# Patient Record
Sex: FEMALE | Race: WHITE | NOT HISPANIC OR LATINO
[De-identification: names, ages, dates, MRNs, and addresses within clinical notes are randomized per-mention and may not be internally consistent; named-entity substitution may affect disease eponyms.]

---

## 2019-03-11 ENCOUNTER — TRANSCRIPTION ENCOUNTER (OUTPATIENT)
Age: 49
End: 2019-03-11

## 2023-12-13 ENCOUNTER — TELEMEDICINE (OUTPATIENT)
Dept: OBGYN CLINIC | Facility: CLINIC | Age: 53
End: 2023-12-13
Payer: OTHER GOVERNMENT

## 2023-12-13 DIAGNOSIS — N64.4 MASTODYNIA: ICD-10-CM

## 2023-12-13 DIAGNOSIS — N95.1 MENOPAUSAL SYMPTOMS: Primary | ICD-10-CM

## 2023-12-13 PROBLEM — E66.811 OBESITY, CLASS I, BMI 30.0-34.9 (SEE ACTUAL BMI): Status: ACTIVE | Noted: 2023-03-22

## 2023-12-13 PROBLEM — R00.2 PALPITATIONS: Status: ACTIVE | Noted: 2023-03-22

## 2023-12-13 PROBLEM — N39.3 SUI (STRESS URINARY INCONTINENCE, FEMALE): Status: ACTIVE | Noted: 2023-06-15

## 2023-12-13 PROBLEM — R53.82 CHRONIC FATIGUE: Status: ACTIVE | Noted: 2023-03-22

## 2023-12-13 PROBLEM — G43.109 OCULAR MIGRAINE: Status: ACTIVE | Noted: 2023-03-22

## 2023-12-13 PROBLEM — G93.49 WHITE MATTER OF BRAIN SYNDROME: Status: ACTIVE | Noted: 2023-03-22

## 2023-12-13 PROBLEM — E66.9 OBESITY, CLASS I, BMI 30.0-34.9 (SEE ACTUAL BMI): Status: ACTIVE | Noted: 2023-03-22

## 2023-12-13 PROCEDURE — 99204 OFFICE O/P NEW MOD 45 MIN: CPT | Performed by: OBSTETRICS & GYNECOLOGY

## 2023-12-13 RX ORDER — ESTRADIOL 0.05 MG/D
PATCH, EXTENDED RELEASE TRANSDERMAL
COMMUNITY
Start: 2023-10-02

## 2023-12-13 RX ORDER — OLOPATADINE HYDROCHLORIDE 1 MG/ML
1 SOLUTION/ DROPS OPHTHALMIC
COMMUNITY

## 2023-12-13 RX ORDER — PROGESTERONE 200 MG/1
1 CAPSULE ORAL DAILY
COMMUNITY
Start: 2023-09-19

## 2023-12-14 NOTE — PROGRESS NOTES
Virtual Regular Visit    Verification of patient location:    Patient is located at Home in the following state in which I hold an active license [place]      Assessment/Plan:    Problem List Items Addressed This Visit    None  Visit Diagnoses       Menopausal symptoms    -  Primary    Relevant Orders    Follicle stimulating hormone    Estradiol    Progesterone    Mastodynia        Relevant Orders    Follicle stimulating hormone    Estradiol    Progesterone          1) I am actually not certain she is completely menopausal. She may be sporadically ovulating, which would produce higher estradiol levels which could affect breast tenderness. Draw [address], estradiol, progesterone levels to confirm  2) I discussed the long term health benefits of E2/PG, including: reduction of CVD risk, reduction of hyperlipidemia, reduction of DM and GLP-1 agonist activity with mild appetite suppression; reduction of colon CA, osteoporosis and cognitive decline, Alzheimer's disease. 3) Controversies surrounding estrogen based HRT discussed, including the fear based off the [facility] trials concerning Prempro. I will not be prescribing Prempro. One should not assume all therapies confer the same risk or benefit. Stay tuned to the [place] trials for E2/PG therapy in the [facility] with [employer], but [place] trials with same medication point to safety and efficacy with superior health outcomes with women on this type of HRT. 4) The best time to target CVD and atherosclerosis is within the first 5 years of menopause with oral estrogen  therapy. Oral progesterone still preferred, no CVD or VTE risk. 5) I totally applaud her HRT regimen, I would have personally started her on oral E2 first. Pending lab results, will transition to oral after present script done, decide on dose then, which would correspond more closely to 1 mg.  IF mastodynia continues, may continue with patch as it has more dosing options in lower strengths than oral.   6) I will notify her of lab results and determine dosing options then. Continue with current gyn team for exams and screening. Follow up prn    This was a 45 minute visit with greater than 50% of time spent in face to face counseling and coordination of care. Reason for visit is   Chief Complaint   Patient presents with    Virtual Regular Visit          Encounter provider Adarsh García MD    Provider located at [facility]  [phone]      Recent Visits  Date Type Provider Dept   12/13/23 Telemedicine Adarsh García MD [facility]   Showing recent visits within past 7 days and meeting all other requirements  Future Appointments  No visits were found meeting these conditions. Showing future appointments within next 150 days and meeting all other requirements       The patient was identified by name and date of birth. Amy Cerrato was informed that this is a telemedicine visit and that the visit is being conducted through the [employer]. She agrees to proceed. .  My office door was closed. No one else was in the room. She acknowledged consent and understanding of privacy and security of the video platform. The patient has agreed to participate and understands they can discontinue the visit at any time. Patient is aware this is a billable service. Donn Ghosh presents for hormonal consult, her first visit with me; self referred, also by her sister, Briana Munson, who  is a patient of mine. Sees [facility] for gyn care, having just moved back to [place] from [address] [place] [address]. [place] Son tells me she had been menopausal for over a year, but had intermittent spotting? S/p recent D/C in 11/23, benign polyp noted. Menopausal status not completely confirmed. Previous provider started her on estradiol patch 0.05/ oral progesterone 200 mg nightly. She admits her hot flashes resolved, sleeping improved. Her issues are now:  1) Heart palpitations, very brief, noted on Apple watch. Hormone related? Saw cardio, no holter monitor offered. 2) Breast tenderness lately, bilateral.   She would like to know if this is the right HRT regimen for her? [address]: as above; hx of ocular migraine; KELLEY  SHX: denies tobacco, ETOH  FHX: Mom HTN, pre DM; Dad COD frontal temporal dementia, HTN. 1 sibling, healthy, ADHD. 1 daughter, 32, healthy. History reviewed. No pertinent past medical history. History reviewed. No pertinent surgical history. Current Outpatient Medications   Medication Sig Dispense Refill    estradiol (VIVELLE-DOT) 0.05 MG/24HR APPLY 1 PATCH TOPICALLY TWICE A WEEK      Progesterone 200 MG CAPS Take 1 capsule by mouth daily      tirzepatide (Mounjaro) 10 MG/0.5ML       glycerin-hypromellose- (ARTIFICIAL TEARS) 0.2-0.2-1 % SOLN Apply to eye      olopatadine (PATANOL) 0.1 % ophthalmic solution Apply 1 drop to eye       No current facility-administered medications for this visit. Not on File    Review of Systems   Constitutional: Negative. Respiratory: Negative. Cardiovascular:  Positive for palpitations. Endocrine: Negative for heat intolerance. Genitourinary:         Sore breasts   Musculoskeletal: Negative. Neurological: Negative. Negative for headaches. Psychiatric/Behavioral: Negative. Video Exam    There were no vitals filed for this visit.     Physical Exam

## 2024-01-04 DIAGNOSIS — N95.1 MENOPAUSAL SYMPTOMS: Primary | ICD-10-CM

## 2024-01-27 DIAGNOSIS — N95.1 MENOPAUSAL SYMPTOMS: Primary | ICD-10-CM

## 2024-01-27 RX ORDER — ESTRADIOL 0.04 MG/D
1 FILM, EXTENDED RELEASE TRANSDERMAL 2 TIMES WEEKLY
Qty: 8 PATCH | Refills: 11 | Status: SHIPPED | OUTPATIENT
Start: 2024-01-29

## 2024-02-01 DIAGNOSIS — R53.82 CHRONIC FATIGUE: Primary | ICD-10-CM

## 2024-02-21 ENCOUNTER — TELEMEDICINE (OUTPATIENT)
Dept: OBGYN CLINIC | Facility: CLINIC | Age: 54
End: 2024-02-21
Payer: COMMERCIAL

## 2024-02-21 DIAGNOSIS — N95.1 MENOPAUSAL SYMPTOMS: Primary | ICD-10-CM

## 2024-02-21 DIAGNOSIS — R53.82 CHRONIC FATIGUE: ICD-10-CM

## 2024-02-21 PROCEDURE — 99215 OFFICE O/P EST HI 40 MIN: CPT | Performed by: OBSTETRICS & GYNECOLOGY

## 2024-02-22 RX ORDER — PROGESTERONE 200 MG/1
CAPSULE ORAL
COMMUNITY
Start: 2023-05-15

## 2024-02-22 NOTE — PROGRESS NOTES
"Virtual Regular Visit    Verification of patient location:    Patient is located at Home in the following state in which I hold an active license [place]      Assessment/Plan:    Problem List Items Addressed This Visit          Other    Chronic fatigue     Other Visit Diagnoses       Menopausal symptoms    -  Primary          1) Labs first reviewed, noting: a) suboptimal DHEAS at 55, prefer greater than 100 for her age; testosterone low normal 25; morning cortisol geat at 10.4. Thyroid axis recently checked and fine  2) We discussed adrenal hypofunction, or \" fatigue\", which is not recognized by the medical literature. I think her sujata menopausal transition has affected adrenal precursors and consumption of DHEAS, which I recommend for fatigue often  3) Recommend start with DHEA 10 mg daily. WE also discussed testosterone replacement, which is much stronger. Testosterone supplementation discussed in detail, including lack of FDA approval for women and cost concerns, as insurance will not reimburse. Side effects include: increased libido, increased muscle mass, decreased fat mass, erythrocytosis ( NOT polycythemia rubra), increased energy, acne, increased hair growth or loss.  4) Will keep E2 at 0.0375 mg/ PG 200mg po and add DHEA 10 mg in am for a couple months. Email with progress report in 2 months as to how she is feeling.  5) Follow up prn    This was a 40 minute visit with greater than 50% of time spent in face to face counseling and coordination of care     Chief Complaint   Patient presents with    Virtual Regular Visit          Encounter provider Norah Luong MD    [facility] [address] [address] [phone]      Recent Visits  Date Type Provider Dept   02/21/24 Telephone Norah Luong MD [facility] [place]   02/21/24 Telemedicine Norah Luong, " [facility]   Showing recent visits within past 7 days and meeting all other requirements  Future Appointments  No visits were found meeting these conditions.  Showing future appointments within next 150 days and meeting all other requirements       The patient was identified by name and date of birth. Beulah Hurtado was informed that this is a telemedicine visit and that the visit is being conducted through the Epic Embedded platform. She agrees to proceed..  My office door was closed. No one else was in the room.  She acknowledged consent and understanding of privacy and security of the video platform. The patient has agreed to participate and understands they can discontinue the visit at any time.    Patient is aware this is a billable service.     Subjective      Beulah returns for hormone consult follow up.   I saw her 12/23 after starting E2 patch 0.05, PG 200mg orally by another provider in her home town. She noted heart palpitations, breast tenderness. Hormone levels drawn her request, PG noted to be low. PG cream 100 gm /d issued instead.  Palpitations and breast pain persisted, sleep a problem. Cardio referral neg. Started having spotting. Decreased E2 patch to 0.037 mg, back to 200 mg oral PG. Bleeding stopped, TVUS normal. Breast tenderness resolved, but now hot flash mild return.  I offered adrenal testing as part of evaluation of chronic fatigue and brain fog issues, will review today.   Palpitations stable       No past medical history on file.    No past surgical history on file.    Current Outpatient Medications   Medication Sig Dispense Refill    Progesterone 200 MG CAPS       estradiol (Vivelle-Dot) 0.0375 MG/24HR Place 1 patch on the skin 2 (two) times a week 8 patch 11    glycerin-hypromellose- (ARTIFICIAL TEARS) 0.2-0.2-1 % SOLN Apply to eye      olopatadine (PATANOL) 0.1 % ophthalmic solution Apply 1 drop to eye      tirzepatide (Mounjaro) 10 MG/0.5ML        No current  facility-administered medications for this visit.        Not on File    Review of Systems   Constitutional:  Positive for fatigue.   Cardiovascular:  Positive for palpitations.   Endocrine: Positive for heat intolerance.   Genitourinary:         Uterine spotting, resolved   Neurological: Negative.    Psychiatric/Behavioral:  Positive for dysphoric mood and sleep disturbance.      Video Exam    There were no vitals filed for this visit.    Physical Exam

## 2024-03-20 DIAGNOSIS — N95.1 MENOPAUSAL SYMPTOMS: ICD-10-CM

## 2024-03-21 RX ORDER — ESTRADIOL 0.04 MG/D
1 FILM, EXTENDED RELEASE TRANSDERMAL 2 TIMES WEEKLY
Qty: 8 PATCH | Refills: 5 | Status: SHIPPED | OUTPATIENT
Start: 2024-03-21

## 2024-05-22 DIAGNOSIS — R53.82 CHRONIC FATIGUE: Primary | ICD-10-CM

## 2024-09-10 DIAGNOSIS — R53.82 CHRONIC FATIGUE: Primary | ICD-10-CM

## 2024-09-12 ENCOUNTER — TELEPHONE (OUTPATIENT)
Age: 54
End: 2024-09-12

## 2024-09-12 NOTE — TELEPHONE ENCOUNTER
Attempted to schedule virtual follow up per Cherise's request, left detailed voicemail per communication consent.

## 2024-10-04 DIAGNOSIS — N95.1 MENOPAUSAL SYMPTOMS: ICD-10-CM

## 2024-10-04 RX ORDER — ESTRADIOL 0.04 MG/D
1 PATCH, EXTENDED RELEASE TRANSDERMAL 2 TIMES WEEKLY
Qty: 8 PATCH | Refills: 11 | Status: SHIPPED | OUTPATIENT
Start: 2024-10-07

## 2024-11-12 ENCOUNTER — TELEMEDICINE (OUTPATIENT)
Age: 54
End: 2024-11-12
Payer: COMMERCIAL

## 2024-11-12 ENCOUNTER — TELEPHONE (OUTPATIENT)
Age: 54
End: 2024-11-12

## 2024-11-12 DIAGNOSIS — G47.9 SLEEP DISTURBANCE: ICD-10-CM

## 2024-11-12 DIAGNOSIS — N95.1 MENOPAUSAL SYMPTOMS: Primary | ICD-10-CM

## 2024-11-12 PROCEDURE — 99213 OFFICE O/P EST LOW 20 MIN: CPT | Performed by: OBSTETRICS & GYNECOLOGY

## 2024-11-12 RX ORDER — PROGESTERONE 200 MG/1
200 CAPSULE ORAL
Qty: 90 CAPSULE | Refills: 3 | Status: SHIPPED | OUTPATIENT
Start: 2024-11-12

## 2024-11-12 NOTE — TELEPHONE ENCOUNTER
Attempted to reach patient regarding INEFFECTIVE insurance. The office cannot start the visit without effective insurance. Left detailed voicemail per communication consent.

## 2024-11-18 NOTE — PROGRESS NOTES
Virtual Regular Visit  Name: Beulah Hurtado      : 1970      MRN: [id number]  Encounter Provider: Norah Luong MD  Encounter Date: 2024   [facility] [place]      Verification of patient location:  Patient is located at Home in the following state in which I hold an active license [place] :  Assessment & Plan  Menopausal symptoms    Orders:    Progesterone 200 MG CAPS; Take 200 mg by mouth at bedtime    Sleep disturbance         1) I Had recommended a repeat adrenal panel off testosterone, lab did not recognize my orders so not done. She will have PCP reorder, and let me know the results.  2) I have not problem with higher dose melatonin, protector of the brain. In [place], many use  mg for dementia treatment!   3) I would not adjust her HRT, this has stabilized. RF  mg nightly.   4) Follow up one year or prn.     This was a 20 minute visit with greater than 50% of time spent in face to face counseling and coordination of care.      Encounter provider Norah Luong MD    The patient was identified by name and date of birth. Beulah Hurtado was informed that this is a telemedicine visit and that the visit is being conducted through the Epic Embedded platform. She agrees to proceed..  My office door was closed. No one else was in the room.  She acknowledged consent and understanding of privacy and security of the video platform. The patient has agreed to participate and understands they can discontinue the visit at any time.    Patient is aware this is a billable service.         Beulah presents for hormone consult follow up. I have been following her for over a year with typical menopausal symptoms. I started her on estradiol patch 0.0375 mg/ oral Progesterone 200 mg with testosterone 3 mg/ d cream after adrenal labs revealed low testosterone.  Since then:  1) In , had issues with breast tenderness, this has since resolved since  testosterone was stopped  2) Sleep; much improved, takes sleep gummies with 20-30 mg of melatonin, wonders if she should be concerned with this dose/   No other change in health status or family history updates.     Review of Systems   Constitutional: Negative.    Gastrointestinal: Negative.    Endocrine: Negative.    Genitourinary: Negative.    Musculoskeletal: Negative.    Neurological: Negative.    Psychiatric/Behavioral: Negative.